# Patient Record
(demographics unavailable — no encounter records)

---

## 2024-11-07 NOTE — REASON FOR VISIT
[Initial Evaluation] : an initial evaluation of [Kawasaki Disease] : Kawasaki disease [Patient] : patient

## 2024-11-07 NOTE — CONSULT LETTER
[Today's Date] : [unfilled] [Name] : Name: [unfilled] [] : : ~~ [Today's Date:] : [unfilled] [Dear  ___:] : Dear Dr. [unfilled]: [Consult] : I had the pleasure of evaluating your patient, [unfilled]. My full evaluation follows. [Consult - Single Provider] : Thank you very much for allowing me to participate in the care of this patient. If you have any questions, please do not hesitate to contact me. [Sincerely,] : Sincerely, [FreeTextEntry4] : Mikey Spaulding MD [FreeTextEntry5] : 1 [facility] [FreeTextEntry6] : [place] [id number] [de-identified] : Gwendolyn Weber MD,FACC, FASDELORES, FAAP\par  Pediatric Cardiologist \par  [facility]\par

## 2024-11-07 NOTE — HISTORY OF PRESENT ILLNESS
[FreeTextEntry1] : PRUDENCIO is a 20 year old male referred for cardiac f/u due to his history of Kawasaki disease in August 2004. He initially had coronary artery dilation, which subsequently improved  - He has been active and asymptomatic. There has been no complaint of chest pain, palpitations, dyspnea, dizziness or syncope. occas goes to gym, good exercise tolerance  studying computer science  I reviewed the lab results from 4/19/19 : total cholesterol  158 mg/dl; LDL 89 ; HDL 52 ; triglycerides 83

## 2024-11-07 NOTE — DISCUSSION/SUMMARY
[PE + No Restrictions] : [unfilled] may participate in the entire physical education program without restriction, including all varsity competitive sports. [FreeTextEntry1] : - In summary, PRUDENCIO is s/p treatment of Kawasaki disease with IVIG and aspirin. He has a history of coronary artery dilation which improved. There is no evidence of proximal coronary artery dilation or aneurysm at this time.   He is asymptomatic.  - I recommend ongoing cardiology follow up. Individuals with a history of Kawasaki disease have an increased risk of premature coronary artery disease which may be related to altered lipid profile and vasomotor reactivity. - There is a family history of hypercholesterolemia. His lipid profile was normal  - No restrictions are needed - I recommend that his next routine followup be with an adult cardiologist in four years or sooner if there are any further cardiac concerns.  It has been my great pleasure and privilege to participate in his care. - He verbalized understanding, and all questions were answered. [Needs SBE Prophylaxis] : [unfilled] does not need bacterial endocarditis prophylaxis

## 2024-11-07 NOTE — PHYSICAL EXAM
[General Appearance - Alert] : alert [General Appearance - In No Acute Distress] : in no acute distress [General Appearance - Well Nourished] : well nourished [General Appearance - Well Developed] : well developed [General Appearance - Well-Appearing] : well appearing [Appearance Of Head] : the head was normocephalic [Facies] : there were no dysmorphic facial features [Sclera] : the conjunctiva were normal [Outer Ear] : the ears and nose were normal in appearance [Examination Of The Oral Cavity] : mucous membranes were moist and pink [Auscultation Breath Sounds / Voice Sounds] : breath sounds clear to auscultation bilaterally [Normal Chest Appearance] : the chest was normal in appearance [Apical Impulse] : quiet precordium with normal apical impulse [Heart Rate And Rhythm] : normal heart rate and rhythm [Heart Sounds] : normal S1 and S2 [No Murmur] : no murmurs  [Heart Sounds Gallop] : no gallops [Heart Sounds Pericardial Friction Rub] : no pericardial rub [Edema] : no edema [Arterial Pulses] : normal upper and lower extremity pulses with no pulse delay [Heart Sounds Click] : no clicks [Capillary Refill Test] : normal capillary refill [Bowel Sounds] : normal bowel sounds [Abdomen Soft] : soft [Nondistended] : nondistended [Abdomen Tenderness] : non-tender [Nail Clubbing] : no clubbing  or cyanosis of the fingers [Motor Tone] : normal muscle strength and tone [Cervical Lymph Nodes Enlarged Anterior] : The anterior cervical nodes were normal [Cervical Lymph Nodes Enlarged Posterior] : The posterior cervical nodes were normal [] : no rash [Skin Lesions] : no lesions [Skin Turgor] : normal turgor [Mood] : mood and affect were appropriate for age [Demonstrated Behavior - Infant Nonreactive To Parents] : interactive [Demonstrated Behavior] : normal behavior

## 2024-11-07 NOTE — CARDIOLOGY SUMMARY
[de-identified] : 10/15/19 [de-identified] : 10/15/19 [Today's Date] : [unfilled] [FreeTextEntry1] : Normal sinus rhythm. Rightward ventricular axis. Atrial and ventricular forces were normal. Early repolarization. QTc 400 [FreeTextEntry2] : Normal intracardiac anatomy. No evidence of coronary artery ectasia or proximal coronary artery aneurysms. trivial tricuspid insufficiency. Qualitatively normal RV size and systolic function. LV dimensions and shortening fraction were normal.  No pericardial effusion.

## 2024-11-07 NOTE — CARDIOLOGY SUMMARY
[de-identified] : 10/15/19 [de-identified] : 10/15/19 [Today's Date] : [unfilled] [FreeTextEntry1] : Normal sinus rhythm. Rightward ventricular axis. Atrial and ventricular forces were normal. Early repolarization. QTc 400 [FreeTextEntry2] : Normal intracardiac anatomy. No evidence of coronary artery ectasia or proximal coronary artery aneurysms. trivial tricuspid insufficiency. Qualitatively normal RV size and systolic function. LV dimensions and shortening fraction were normal.  No pericardial effusion.

## 2024-11-07 NOTE — CONSULT LETTER
[Today's Date] : [unfilled] [Name] : Name: [unfilled] [] : : ~~ [Today's Date:] : [unfilled] [Dear  ___:] : Dear Dr. [unfilled]: [Consult] : I had the pleasure of evaluating your patient, [unfilled]. My full evaluation follows. [Consult - Single Provider] : Thank you very much for allowing me to participate in the care of this patient. If you have any questions, please do not hesitate to contact me. [Sincerely,] : Sincerely, [FreeTextEntry4] : Mikey Spaulding MD [FreeTextEntry5] : 1 [facility] [FreeTextEntry6] : [place] [id number] [de-identified] : Gwendolyn Weber MD,FACC, FASDELORES, FAAP\par  Pediatric Cardiologist \par  [facility]\par

## 2025-03-18 NOTE — PHYSICAL EXAM
[Well Developed] : well developed [Well Nourished] : well nourished [No Acute Distress] : no acute distress [Normal Conjunctiva] : normal conjunctiva [Normal Venous Pressure] : normal venous pressure [No Carotid Bruit] : no carotid bruit [Normal S1, S2] : normal S1, S2 [No Murmur] : no murmur [No Rub] : no rub [No Gallop] : no gallop [Normal Rate] : normal [Rhythm Regular] : regular [Clear Lung Fields] : clear lung fields [Good Air Entry] : good air entry [No Respiratory Distress] : no respiratory distress  [Soft] : abdomen soft [Non Tender] : non-tender [No Masses/organomegaly] : no masses/organomegaly [Normal Bowel Sounds] : normal bowel sounds [Normal Gait] : normal gait [No Edema] : no edema [No Cyanosis] : no cyanosis [No Clubbing] : no clubbing [No Varicosities] : no varicosities [No Rash] : no rash [No Skin Lesions] : no skin lesions [Moves all extremities] : moves all extremities [No Focal Deficits] : no focal deficits [Normal Speech] : normal speech [Alert and Oriented] : alert and oriented [Normal memory] : normal memory [Bradycardia] : bradycardic [Heart Rate ___] : [unfilled] bpm [de-identified] : No chest wall tenderness to palpation.

## 2025-03-18 NOTE — REASON FOR VISIT
[Other: ____] : [unfilled] [Parent] : parent [FreeTextEntry3] : Elaina Yeager MD [FreeTextEntry1] : History was provided by patient and chart review.

## 2025-03-18 NOTE — HISTORY OF PRESENT ILLNESS
[FreeTextEntry1] : Mr. Jordan Pierre was seen at the [facility] located in [place], [place], on 3/18/2025. The patient is a 20-year-old man with reported history of Kawasaki disease and coronary artery dilation which has improved per report. He also reportedly received aspirin and IVIG.  He had recent chest pain. He was referred specifically for chest pain.   He was in his normal state of health until about a week prior to 3/678652. He reports "eating junk food." He ate [employer] then had sudden chest pain, centrally located, no radiation, mild in intensity, did not alleviate, and non-exertional. It was a constant pain. It felt like a burning sensation.   He denies nausea, dyspnea or syncope. The pain was no pleuritic, positional and there were no reports of rash or trauma.    His labs at [facility] 3/10/25 demonstrated WBC 4.3, Hgb 14, platelet count 251K, Na 139, K 4.1, BUN 11, and Cr 0.73, Ca 9.0, AST 17 and ALT 10. NTproBNP <36 and troponin <6. ECG SR with rightward axis and benign early repol. CXR negative.   Family history: patient reports no knowledge of premature CAD, SCD, device therapies or congenital heart disease in the family.  Social history: patient denies smoking or using illicit drugs. Drinks alcohol rarely. Single. No children. He is college and wants to become a .  CV meds: none Exercise: he regularly goes to the gym and does not have chest pain  Diet: "junk food"

## 2025-03-18 NOTE — HISTORY OF PRESENT ILLNESS
[FreeTextEntry1] : Mr. Jordan Pierre was seen at the [facility] located in [place], [place], on 3/18/2025. The patient is a 20-year-old man with reported history of Kawasaki disease and coronary artery dilation which has improved per report. He also reportedly received aspirin and IVIG.  He had recent chest pain. He was referred specifically for chest pain.   He was in his normal state of health until about a week prior to 3/522124. He reports "eating junk food." He ate [employer] then had sudden chest pain, centrally located, no radiation, mild in intensity, did not alleviate, and non-exertional. It was a constant pain. It felt like a burning sensation.   He denies nausea, dyspnea or syncope. The pain was no pleuritic, positional and there were no reports of rash or trauma.    His labs at [facility] 3/10/25 demonstrated WBC 4.3, Hgb 14, platelet count 251K, Na 139, K 4.1, BUN 11, and Cr 0.73, Ca 9.0, AST 17 and ALT 10. NTproBNP <36 and troponin <6. ECG SR with rightward axis and benign early repol. CXR negative.   Family history: patient reports no knowledge of premature CAD, SCD, device therapies or congenital heart disease in the family.  Social history: patient denies smoking or using illicit drugs. Drinks alcohol rarely. Single. No children. He is college and wants to become a .  CV meds: none Exercise: he regularly goes to the gym and does not have chest pain  Diet: "junk food"

## 2025-03-18 NOTE — PHYSICAL EXAM
[Well Developed] : well developed [Well Nourished] : well nourished [No Acute Distress] : no acute distress [Normal Conjunctiva] : normal conjunctiva [Normal Venous Pressure] : normal venous pressure [No Carotid Bruit] : no carotid bruit [Normal S1, S2] : normal S1, S2 [No Murmur] : no murmur [No Rub] : no rub [No Gallop] : no gallop [Normal Rate] : normal [Rhythm Regular] : regular [Clear Lung Fields] : clear lung fields [Good Air Entry] : good air entry [No Respiratory Distress] : no respiratory distress  [Soft] : abdomen soft [Non Tender] : non-tender [No Masses/organomegaly] : no masses/organomegaly [Normal Bowel Sounds] : normal bowel sounds [Normal Gait] : normal gait [No Edema] : no edema [No Cyanosis] : no cyanosis [No Clubbing] : no clubbing [No Varicosities] : no varicosities [No Rash] : no rash [No Skin Lesions] : no skin lesions [Moves all extremities] : moves all extremities [No Focal Deficits] : no focal deficits [Normal Speech] : normal speech [Alert and Oriented] : alert and oriented [Normal memory] : normal memory [Bradycardia] : bradycardic [Heart Rate ___] : [unfilled] bpm [de-identified] : No chest wall tenderness to palpation.

## 2025-03-18 NOTE — CARDIOLOGY SUMMARY
[de-identified] : 3/18/2025 sinus bradycardia with ventricular rate of 51 bpm. TN interval 172 ms and QRS duration of 96 ms. Right axis. QTc 368 ms. Inferior ST elevation due to repolarization - seen before.  [de-identified] : 10/15/19. Normal intracardiac anatomy. No evidence of coronary artery ectasia or proximal coronary artery aneurysms. LV dimensions and shortening fraction were normal. No pericardial effusion.

## 2025-03-18 NOTE — DISCUSSION/SUMMARY
[EKG obtained to assist in diagnosis and management of assessed problem(s)] : EKG obtained to assist in diagnosis and management of assessed problem(s) [FreeTextEntry1] : Mr. Jordan Pierre is a 20-year-old man with  1. Reported history of Kawasaki disease and coronary artery dilation which has improved per report. He also reportedly received aspirin and IVIG.  2. Chest pain. He was referred specifically for chest pain.  3. Bradycardia.   - He experienced a recent episode of chest pain that appears most suggestive of gastroesophageal reflux disease. He was prescribed famotidine 20 mg daily which resolved his pain. He has no angina. There is no evidence of heart failure.   - He does have sinus bradycardia but likely reflects his fitness level. I do recommend TSH and ETT.  I want to assess chronotropic response to exercise. There are no obvious absolute contraindications to ETT currently present such as acute myocardial infarction, aortic dissection, pulmonary embolism, high-risk unstable angina, heart failure, uncontrolled arrhythmia, severe symptomatic aortic valve stenosis, myocarditis, or pericarditis. ETT is generally a safe procedure, with a risk of death or myocardial infarction (MI) of 1:2,500 tests. Signed and verbal consent was obtained from the patient. The patient had ample time and opportunity to discuss the test with me and agreed to undergo ETT.  - By report and documentation provided by his previous pediatric cardiologist, Dr. Weber, he has prior Kawasaki disease. He was treated with aspirin and IVIG in the past. Also, per report and documentation, his coronary arteries showed no proximal dilatation based on echo. Coronary CT angiogram would be a consideration if he has recurrent chest pain despite being on the H2 antagonist.   - I recommended lifestyle modifications for long-term cardiovascular health, including adopting a Mediterranean diet pattern to lower cardiovascular disease risk. I recommended at least 150 minutes per week of moderate-intensity exercise or 75 minutes of vigorous activity combined with muscle strengthening activities at least twice weekly. I reviewed cardiac alarm symptoms with the patient and next steps if the patient experiences such symptoms.   - I advised he avoid junk food and energy drinks.    - I recommend a follow-up appointment with me in 6 months but sooner if there is a significant abnormality seen on the labs and ETT.    The patient agreed with the above recommendations and was appreciative of the care provided. The patient should continue seeing their primary care physician for overall health maintenance. I am happy to answer any further questions so please call my office if needed. Thank you for the opportunity to participate in the care of Ignacio.     Shaun Cornejo DO, [facility] Cardio-Obstetrics Cardiologist Adult Congenital Heart Disease Cardiologist [address], [place] [address] Office telephone number [phone]

## 2025-03-18 NOTE — CARDIOLOGY SUMMARY
[de-identified] : 3/18/2025 sinus bradycardia with ventricular rate of 51 bpm. UT interval 172 ms and QRS duration of 96 ms. Right axis. QTc 368 ms. Inferior ST elevation due to repolarization - seen before.  [de-identified] : 10/15/19. Normal intracardiac anatomy. No evidence of coronary artery ectasia or proximal coronary artery aneurysms. LV dimensions and shortening fraction were normal. No pericardial effusion.

## 2025-03-18 NOTE — DISCUSSION/SUMMARY
[EKG obtained to assist in diagnosis and management of assessed problem(s)] : EKG obtained to assist in diagnosis and management of assessed problem(s) [FreeTextEntry1] : Mr. Jordan Pierre is a 20-year-old man with  1. Reported history of Kawasaki disease and coronary artery dilation which has improved per report. He also reportedly received aspirin and IVIG.  2. Chest pain. He was referred specifically for chest pain.  3. Bradycardia.   - He experienced a recent episode of chest pain that appears most suggestive of gastroesophageal reflux disease. He was prescribed famotidine 20 mg daily which resolved his pain. He has no angina. There is no evidence of heart failure.   - He does have sinus bradycardia but likely reflects his fitness level. I do recommend TSH and ETT.  I want to assess chronotropic response to exercise. There are no obvious absolute contraindications to ETT currently present such as acute myocardial infarction, aortic dissection, pulmonary embolism, high-risk unstable angina, heart failure, uncontrolled arrhythmia, severe symptomatic aortic valve stenosis, myocarditis, or pericarditis. ETT is generally a safe procedure, with a risk of death or myocardial infarction (MI) of 1:2,500 tests. Signed and verbal consent was obtained from the patient. The patient had ample time and opportunity to discuss the test with me and agreed to undergo ETT.  - By report and documentation provided by his previous pediatric cardiologist, Dr. Weber, he has prior Kawasaki disease. He was treated with aspirin and IVIG in the past. Also, per report and documentation, his coronary arteries showed no proximal dilatation based on echo. Coronary CT angiogram would be a consideration if he has recurrent chest pain despite being on the H2 antagonist.   - I recommended lifestyle modifications for long-term cardiovascular health, including adopting a Mediterranean diet pattern to lower cardiovascular disease risk. I recommended at least 150 minutes per week of moderate-intensity exercise or 75 minutes of vigorous activity combined with muscle strengthening activities at least twice weekly. I reviewed cardiac alarm symptoms with the patient and next steps if the patient experiences such symptoms.   - I advised he avoid junk food and energy drinks.    - I recommend a follow-up appointment with me in 6 months but sooner if there is a significant abnormality seen on the labs and ETT.    The patient agreed with the above recommendations and was appreciative of the care provided. The patient should continue seeing their primary care physician for overall health maintenance. I am happy to answer any further questions so please call my office if needed. Thank you for the opportunity to participate in the care of Ignacio.     Shaun Cornejo DO, [facility] Cardio-Obstetrics Cardiologist Adult Congenital Heart Disease Cardiologist [address], [place] [address] Office telephone number [phone]